# Patient Record
Sex: FEMALE | Race: OTHER
[De-identification: names, ages, dates, MRNs, and addresses within clinical notes are randomized per-mention and may not be internally consistent; named-entity substitution may affect disease eponyms.]

---

## 2019-08-23 NOTE — PRE-OP HX & PHY REPO 2 SIG
DATE OF ADMISSION:  08/26/2019

DATE OF ANTICIPATED SURGERY:  08/26/2019.



PREOPERATIVE DIAGNOSIS:  Retinal detachment, right eye, macula off.



BRIEF NOTE:  This is a first [facility] admission for the patient,

who is a very pleasant 69-year-old lady, who complained of progressive

blurring of vision with shadow in the right eye for the past two weeks.

She was noted to have an inferior retinal detachment with the macula

off.



PAST OCULAR HISTORY:  Remarkable for cataract surgery done five years ago

in both eyes.  She had laser treatment to retinal tear successful in the

left eye some time ago.



PAST MEDICAL HISTORY:  Remarkable for elevated cholesterol and thyroid

issues.



PAST SURGICAL HISTORY:  She had prior knee surgery on the right and a wrist

fracture in 2018.



MEDICATIONS:  She is on anticholesterol medications and thyroid supplement.

She also takes aspirin.



ALLERGIES:  She has no known allergies.



PHYSICAL EXAMINATION:  Best vision at the time of admission was 20/200 in

the right eye and 20/20- in the left.  There was an absence of superior

_____ on the right and a central scotoma.  The left appeared benign.

Posterior chamber lens was seen in either eye.



Fundus exam of the right eye showed an inferior retinal detachment

extending from the 2 o'clock position around to the 9:30 position with the

macula off.  Small perivascular holes were seen at about the 6:30 position

just anterior to the equator.  The left fundus showed old laser scars at

the 4 o'clock position.  The retina was attached.



ASSESSMENT:  Macula off retinal detachment, right eye.



PLAN:  The plan is to perform a pars plana vitrectomy with scleral buckle,

endolaser, and gas injection on the right.  The risks and benefits of

surgery gone over with the patient with potential infection, hemorrhage,

glaucoma, and remote possibility of loss of the eye, the risk of

anesthesia was discussed.  The patient understands and consents to

surgery, which will be performed on Monday morning.









  ______________________________________________

  Sebastian Barnes M.D.





DR:  MILES

D:  08/23/2019 13:57

T:  08/24/2019 03:50

JOB#:  [id number]

CC:

## 2019-08-26 ENCOUNTER — HOSPITAL ENCOUNTER (OUTPATIENT)
Dept: HOSPITAL 72 - SUR | Age: 69
Discharge: HOME | End: 2019-08-26
Payer: MEDICARE

## 2019-08-26 VITALS — DIASTOLIC BLOOD PRESSURE: 80 MMHG | SYSTOLIC BLOOD PRESSURE: 140 MMHG

## 2019-08-26 VITALS — DIASTOLIC BLOOD PRESSURE: 84 MMHG | SYSTOLIC BLOOD PRESSURE: 141 MMHG

## 2019-08-26 VITALS — SYSTOLIC BLOOD PRESSURE: 144 MMHG | DIASTOLIC BLOOD PRESSURE: 78 MMHG

## 2019-08-26 VITALS — DIASTOLIC BLOOD PRESSURE: 59 MMHG | SYSTOLIC BLOOD PRESSURE: 155 MMHG

## 2019-08-26 VITALS — DIASTOLIC BLOOD PRESSURE: 77 MMHG | SYSTOLIC BLOOD PRESSURE: 149 MMHG

## 2019-08-26 VITALS — HEIGHT: 65 IN | BODY MASS INDEX: 34.49 KG/M2 | WEIGHT: 207 LBS

## 2019-08-26 VITALS — SYSTOLIC BLOOD PRESSURE: 152 MMHG | DIASTOLIC BLOOD PRESSURE: 73 MMHG

## 2019-08-26 VITALS — DIASTOLIC BLOOD PRESSURE: 59 MMHG | SYSTOLIC BLOOD PRESSURE: 161 MMHG

## 2019-08-26 VITALS — SYSTOLIC BLOOD PRESSURE: 161 MMHG | DIASTOLIC BLOOD PRESSURE: 72 MMHG

## 2019-08-26 VITALS — DIASTOLIC BLOOD PRESSURE: 95 MMHG | SYSTOLIC BLOOD PRESSURE: 152 MMHG

## 2019-08-26 VITALS — DIASTOLIC BLOOD PRESSURE: 80 MMHG | SYSTOLIC BLOOD PRESSURE: 14 MMHG

## 2019-08-26 VITALS — DIASTOLIC BLOOD PRESSURE: 74 MMHG | SYSTOLIC BLOOD PRESSURE: 153 MMHG

## 2019-08-26 VITALS — SYSTOLIC BLOOD PRESSURE: 151 MMHG | DIASTOLIC BLOOD PRESSURE: 78 MMHG

## 2019-08-26 DIAGNOSIS — E78.00: ICD-10-CM

## 2019-08-26 DIAGNOSIS — M19.90: ICD-10-CM

## 2019-08-26 DIAGNOSIS — E03.9: ICD-10-CM

## 2019-08-26 DIAGNOSIS — E66.9: ICD-10-CM

## 2019-08-26 DIAGNOSIS — Z79.899: ICD-10-CM

## 2019-08-26 DIAGNOSIS — H33.21: Primary | ICD-10-CM

## 2019-08-26 DIAGNOSIS — Z79.82: ICD-10-CM

## 2019-08-26 DIAGNOSIS — E78.5: ICD-10-CM

## 2019-08-26 LAB
ADD MANUAL DIFF: NO
ANION GAP SERPL CALC-SCNC: 8 MMOL/L (ref 5–15)
BASOPHILS NFR BLD AUTO: 1.3 % (ref 0–2)
BUN SERPL-MCNC: 13 MG/DL (ref 7–18)
CALCIUM SERPL-MCNC: 9.3 MG/DL (ref 8.5–10.1)
CHLORIDE SERPL-SCNC: 108 MMOL/L (ref 98–107)
CO2 SERPL-SCNC: 26 MMOL/L (ref 21–32)
CREAT SERPL-MCNC: 0.8 MG/DL (ref 0.55–1.3)
EOSINOPHIL NFR BLD AUTO: 4.4 % (ref 0–3)
ERYTHROCYTE [DISTWIDTH] IN BLOOD BY AUTOMATED COUNT: 12.8 % (ref 11.6–14.8)
HCT VFR BLD CALC: 42.3 % (ref 37–47)
HGB BLD-MCNC: 13.8 G/DL (ref 12–16)
LYMPHOCYTES NFR BLD AUTO: 33.3 % (ref 20–45)
MCV RBC AUTO: 91 FL (ref 80–99)
MONOCYTES NFR BLD AUTO: 9.8 % (ref 1–10)
NEUTROPHILS NFR BLD AUTO: 51.2 % (ref 45–75)
PLATELET # BLD: 228 K/UL (ref 150–450)
POTASSIUM SERPL-SCNC: 3.8 MMOL/L (ref 3.5–5.1)
RBC # BLD AUTO: 4.66 M/UL (ref 4.2–5.4)
SODIUM SERPL-SCNC: 142 MMOL/L (ref 136–145)
WBC # BLD AUTO: 4.3 K/UL (ref 4.8–10.8)

## 2019-08-26 PROCEDURE — 85025 COMPLETE CBC W/AUTO DIFF WBC: CPT

## 2019-08-26 PROCEDURE — 80048 BASIC METABOLIC PNL TOTAL CA: CPT

## 2019-08-26 PROCEDURE — 94003 VENT MGMT INPAT SUBQ DAY: CPT

## 2019-08-26 PROCEDURE — 93005 ELECTROCARDIOGRAM TRACING: CPT

## 2019-08-26 PROCEDURE — 36415 COLL VENOUS BLD VENIPUNCTURE: CPT

## 2019-08-26 PROCEDURE — 94150 VITAL CAPACITY TEST: CPT

## 2019-08-26 PROCEDURE — 67108 REPAIR DETACHED RETINA: CPT

## 2019-08-26 RX ADMIN — MOXIFLOXACIN HYDROCHLORIDE SCH DROP: 5 SOLUTION/ DROPS OPHTHALMIC at 06:54

## 2019-08-26 RX ADMIN — FLURBIPROFEN SODIUM SCH DROP: 0.3 SOLUTION/ DROPS OPHTHALMIC at 07:02

## 2019-08-26 RX ADMIN — PHENYLEPHRINE HYDROCHLORIDE SCH DROP: 25 SOLUTION/ DROPS OPHTHALMIC at 07:13

## 2019-08-26 RX ADMIN — MOXIFLOXACIN HYDROCHLORIDE SCH DROP: 5 SOLUTION/ DROPS OPHTHALMIC at 07:02

## 2019-08-26 RX ADMIN — CYCLOPENTOLATE HYDROCHLORIDE SCH DROP: 10 SOLUTION OPHTHALMIC at 06:54

## 2019-08-26 RX ADMIN — PHENYLEPHRINE HYDROCHLORIDE SCH DROP: 25 SOLUTION/ DROPS OPHTHALMIC at 06:54

## 2019-08-26 RX ADMIN — PHENYLEPHRINE HYDROCHLORIDE SCH DROP: 25 SOLUTION/ DROPS OPHTHALMIC at 07:02

## 2019-08-26 RX ADMIN — FLURBIPROFEN SODIUM SCH DROP: 0.3 SOLUTION/ DROPS OPHTHALMIC at 07:13

## 2019-08-26 RX ADMIN — FLURBIPROFEN SODIUM SCH DROP: 0.3 SOLUTION/ DROPS OPHTHALMIC at 06:54

## 2019-08-26 RX ADMIN — MOXIFLOXACIN HYDROCHLORIDE SCH DROP: 5 SOLUTION/ DROPS OPHTHALMIC at 07:13

## 2019-08-26 RX ADMIN — CYCLOPENTOLATE HYDROCHLORIDE SCH DROP: 10 SOLUTION OPHTHALMIC at 07:02

## 2019-08-26 RX ADMIN — CYCLOPENTOLATE HYDROCHLORIDE SCH DROP: 10 SOLUTION OPHTHALMIC at 07:13

## 2019-08-26 NOTE — IMMEDIATE POST-OP EVALUATION
Immediate Post-Op Evalulation


Immediate Post-Op Evalulation


Procedure:  pars plana vitrectomy, cryo scleral buckle, endolaser gas/fluid 

exchange 23


Date of Evaluation:  Aug 26, 2019


Time of Evaluation:  11:52


IV Fluids:  lr 1000ml, 0.9ns 25ml


Blood Products:  none


Estimated Blood Loss:  negligible


Blood Pressure Systolic:  149


Blood Pressure Diastolic:  77


Pulse Rate:  94


Respiratory Rate:  18


O2 Sat by Pulse Oximetry:  99


Temperature (Fahrenheit):  97.0


Pain Score (1-10):  0


Nausea:  No


Vomiting:  No


Complications


none


Patient Status:  awake, reacts, patent, extubated


Hydration Status:  adequate


Drug:  Aurelia Castillo MD Aug 26, 2019 12:00

## 2019-08-26 NOTE — BRIEF OPERATIVE NOTE
Immediate Post Operative Note


Operative Note


Chief Complaint:  Shadow in vision Right eye


Pre-op Diagnosis:


Retinal detachment Right eye, macula off


Procedure:


PPV, scleral buckle, endolaser, cryopexy, Perfluoron placement and removal, gas 

fluid exchange 24% SF-6 Right eye


Post-op Diagnosis:  same as pre-op


Surgeon:  kvng


Anesthesiologist:  homero


Anesthesia:  general


Specimen:  none


Complications:  none


Condition:  stable


Fluids:  per anesthesia


Estimated Blood Loss:  none


Drains:  none


Implant(s) used?:  Yes - 240 band, 287 tire.70 dahianave











Sebastian Barnes MD Aug 26, 2019 11:38

## 2019-08-26 NOTE — ANETHESIA PREOPERATIVE EVAL
Anesthesia Pre-op PMH/ROS


General


Date of Evaluation:  Aug 26, 2019


Time of Evaluation:  08:48


Anesthesiologist:  lloyd


ASA Score:  ASA 3


Mallampati Score


Class I : Soft palate, uvula, fauces, pillars visible


Class II: Soft palate, uvula, fauces visible


Class III: Soft palate, base of uvula visible


Class IV: Only hard plate visible


Mallampati Classification:  Class II


Surgeon:  kvng


Diagnosis:  Retinal detachment right eye


Surgical Procedure:  pars plana vitrectomy, scleral buckle, cryo, endolaser, gas

/fluid exchange


Anesthesia History:  none


Social History:  smoking - former


Family History:  no anesthesia problems


Allergies:  


Coded Allergies:  


     No Known Allergies (Unverified , 8/26/19)


Medications:  see eMAR


Patient NPO?:  Yes





Past Medical History


Cardiovascular:  Reports: other - hypercholesterolemia


Neurologic/Psychiatric:  Reports: depression/anxiety


Endocrine:  Reports: hypothyroidism


Musculoskeletal/Integumentary:  Reports: OA, DJD - tka left knee, btl


Other:  obesity





Anesthesia Pre-op Phys. Exam


Physician Exam


Constitutional:  NAD


Neurologic:  CN 2-12 intact


Cardiovascular:  RRR


Gastrointestinal:  S/NT/ND





Airway Exam


Mallampati Score:  Class II


MO:  limited


Neck:  short


TMD:  2fb


ROM:  limited


Teeth:  missing


Dentures:  upper, lower





Anesthesia Pre-op A/P


Labs





Hematology








Test


  8/26/19


07:10


 


White Blood Count


  4.3 K/UL


(4.8-10.8)  L


 


Red Blood Count


  4.66 M/UL


(4.20-5.40)


 


Hemoglobin


  13.8 G/DL


(12.0-16.0)


 


Hematocrit


  42.3 %


(37.0-47.0)


 


Mean Corpuscular Volume 91 FL (80-99)  


 


Mean Corpuscular Hemoglobin


  29.7 PG


(27.0-31.0)


 


Mean Corpuscular Hemoglobin


Concent 32.7 G/DL


(32.0-36.0)


 


Red Cell Distribution Width


  12.8 %


(11.6-14.8)


 


Platelet Count


  228 K/UL


(150-450)


 


Mean Platelet Volume


  8.8 FL


(6.5-10.1)


 


Neutrophils (%) (Auto)


  51.2 %


(45.0-75.0)


 


Lymphocytes (%) (Auto)


  33.3 %


(20.0-45.0)


 


Monocytes (%) (Auto)


  9.8 %


(1.0-10.0)


 


Eosinophils (%) (Auto)


  4.4 %


(0.0-3.0)  H


 


Basophils (%) (Auto)


  1.3 %


(0.0-2.0)








Chemistry








Test


  8/26/19


07:10


 


Sodium Level


  142 MMOL/L


(136-145)


 


Potassium Level


  3.8 MMOL/L


(3.5-5.1)


 


Chloride Level


  108 MMOL/L


()  H


 


Carbon Dioxide Level


  26 MMOL/L


(21-32)


 


Anion Gap


  8 mmol/L


(5-15)


 


Blood Urea Nitrogen


  13 mg/dL


(7-18)


 


Creatinine


  0.8 MG/DL


(0.55-1.30)


 


Estimat Glomerular Filtration


Rate > 60 mL/min


(>60)


 


Glucose Level


  100 MG/DL


()


 


Calcium Level


  9.3 MG/DL


(8.5-10.1)











Risk Assessment & Plan


Assessment:


asa3


Plan:


gen


Status Change Before Surgery:  No





Pre-Antibiotics


Drug:  Aurelia Castillo MD Aug 26, 2019 11:58

## 2019-08-26 NOTE — 48 HOUR POST ANESTHESIA EVAL
Post Anesthesia Evaluation


Procedure:  pars plana vitrectomy, cryo scleral buckle, endolaser gas/fluid 

exchange 23


Date of Evaluation:  Aug 26, 2019


Time of Evaluation:  12:00


Blood Pressure Systolic:  151


0:  78


Pulse Rate:  88


Respiratory Rate:  18


Temperature (Fahrenheit):  97.0


O2 Sat by Pulse Oximetry:  97


Airway:  patent


Nausea:  No


Vomiting:  No


Pain Intensity:  0


Hydration Status:  adequate


Cardiopulmonary Status:


stable


Mental Status/LOC:  patient returned to baseline


Post-Anesthesia Complications:


none


Follow-up care needed:  N/A











Aurelia Muniz MD Aug 26, 2019 12:02

## 2019-08-26 NOTE — PRE-PROCEDURE NOTE/ATTESTATION
Pre-Procedure Note/Attestation


Complete Prior to Procedure


Planned Procedure:  right


Procedure Narrative:


Scleral buckle, Pars Plan vitrectomy, endolaser, cryopexy. gas-fluid exchange 

Right eye





Indications for Procedure


Pre-Operative Diagnosis:


Retinal detachment Right eye, macula off





Attestation


I attest that I discussed the nature of the procedure; its benefits; risks and 

complications; and alternatives (and the risks and benefits of such alternatives

), prior to the procedure, with the patient (or the patient's legal 

representative).





I attest that, if there was a reasonable possibility of needing a blood 

transfusion, the patient (or the patient's legal representative) was given the 

[place] Department [employer] standardized written summary, pursuant 

to the Jorge Inger Blood Safety Act ([place] Health and Safety Code # 1645, as 

amended).





I attest that I re-evaluated the patient just prior to the surgery and that 

there has been no change in the patient's H&P, except as documented below:











Sebastian Barnes MD Aug 26, 2019 08:52

## 2019-08-27 NOTE — OPERATIVE NOTE - DICTATED
DATE OF OPERATION:  08/26/2019

PREOPERATIVE DIAGNOSIS:  Macula-off retinal detachment, right eye.

 



POSTOPERATIVE DIAGNOSIS:  Macula-off retinal detachment, right eye.



PROCEDURES:

1. Pars plana vitrectomy.

2. Scleral buckle.

3. Endolaser.

4. Peripheral cryopexy.

5. Perfluoron placement and removal.

6. Gas-fluid exchange, right eye.



SURGEON:  Sebastian Barnes M.D.



ASSISTANT:  None.



ANESTHESIA:  LMA general.



ANESTHESIOLOGIST:  Dr. Bess.



JUSTIFICATION FOR SURGERY:  This 69-year-old lady developed a shadow and

eventual loss of central vision in the right eye over the past two weeks.

She was found to have a macula-off retinal detachment.



BRIEF NOTE:  The patient was brought to the operating room, placed on OR

table in supine position.  After a time-out was performed and agreed upon

by the staff, general LMA anesthesia was induced by Dr. Bess.  The

patient was then prepped and draped in the normal manner.  A lid speculum

inserted into the right eye.  A 360-degree peritomy was cut with

relaxation incisions at the 3 and 9 o'clock positions.  Sub-Tenon's

anesthetic was then injected in all four quadrants.  The muscles were

isolated and turned on white and black ties.  Examination revealed area of

a small peripheral break at the 4 o'clock position and a second area at

roughly the 7 o'clock position with pigment at 6.  These areas were marked

on the sclera and gentle cryopexy was used to treat the suspicious

areas.



Preparation for scleral buckle was then made.  Mattress sutures of 5-0

nylon were placed 6 mm apart in the inferior quadrant, centered 13 mm from

the limbus.  Mattress sutures were placed in superior quadrant, also

centered 13 mm from the limbus roughly 2.5 mm apart.  A segment of 240

band was then chosen and placed around the eye and secured supranasally

with a 70 sleeve.  A segment of 287 tire was then chosen and placed

beneath the inferior mattress sutures and beneath the inferior rectus

muscle.  The sutures were tied and all knots rotated posteriorly.  The

buckle was tightened loosely for moderate buckling affect.  Preparation

was then made for pars plana vitrectomy.  Using a 23-gauge trocar system,

cannulas were placed in all except infranasal quadrant.  Infusion secured

inferotemporally.  Vitrectomy was begun posterior to the lens implant.  A

central core vitrectomy was done followed by peripheral vitrectomy leaving

a small vitreous skirt.  Shaving of the vitreous in the periphery was

performed.  A small break noted at the 7 o'clock position was marked with

the Endodiathermy.  It was elected not to perform a posterior retinotomy,

but instead Perfluoron was slowly injected into the eye and fluid was

drained from the inferotemporal break.  Roughly 90% of the fluid was

removed in this way.  The endolaser was brought into the eye and a power

of 0.3 calderón, duration 0.2 seconds, a total of 581 lesions were applied

reinforcing the light cryo especially at the 7 o'clock break.  Additional

suspicious lesions were also treated with laser.



An air-fluid exchange was then performed using extrusion to remove the

Perfluoron and remaining of vitreous fluid.  The retina was noted to be

flattened nicely and remained dry free of heavy Perfluoron.



At this juncture, a gas-gas exchange was performed using a 24% mixture

of SF6.  The superior cannulas were then removed from the eye and the

wounds closed with 8-0 Vicryl suture.  With the eye inflated to normal

tension, the infusion line was removed and this sclerotomy was closed in a

similar fashion.



The bridle sutures were removed and conjunctiva and Tenon's were pulled

up and secured with 6-0 plain catgut with the knots buried at 3 and 9.

Subconjunctival Decadron and gentamicin were then injected inferiorly and

topical Maxitrol ointment, prednisolone drops, and moxifloxacin drops were

instilled.  The eye was patched and shielded.  The patient taken to

recovery in excellent condition.  There were no complications.  The

patient is to be placed in a face-down or left side down position for 45

minutes or hour over the next 24 hours.









  ______________________________________________

  Sebastian Barnes M.D.





DR:  MILES

D:  08/26/2019 11:46

T:  08/27/2019 00:31

JOB#:  [id number]

CC:  Sebastian Barnes M.D.; Fax#:  [phone]

## 2019-08-27 NOTE — CARDIOLOGY REPORT
--------------- APPROVED REPORT --------------





EKG Measurement

Heart [id number]

AK [id number]

[id number]

[id number]

[id number]





Normal sinus rhythm

Normal ECG

## 2019-08-27 NOTE — PRE-OP HX & PHY REPO 2 SIG
DATE OF ADMISSION:  08/26/2019

PRESURGICAL INTERNAL MEDICINE HISTORY AND PHYSICAL



REASON FOR EVALUATION:  I was asked by Dr. Sebastian Barnes to see this

69-year-old female who is going for elective surgery on the right eye.

The patient has retinal detachment, right eye.  The patient was examined.

Chart was reviewed in the outpatient surgical procedure at [facility].



PAST MEDICAL HISTORY/REVIEW OF SYSTEMS:  Remarkable for hypothyroidism.  No

history of hypertension.  No chest pain, palpitation, or heart attack.

Denies history of respiratory problem.  The patient has history of

hyperlipidemia.  No GI bleeding or heartburn.  No hepatitis.  Denies

history of renal failure.  No respiratory problem.



FAMILY HISTORY:  Father has diabetes mellitus and mother hypertension.



ALLERGIES:  Not known.



PRESENT MEDICATIONS:  Include levothyroxine, aspirin, and statin.



HABITS:  The patient smoked for approximately 25 years heavily, stopped

smoking 20 years ago.  Denies alcohol or street drug use.



PHYSICAL EXAMINATION:

GENERAL:  The patient is alert, well-developed and well-nourished female,

in her 60s, no acute distress.

VITAL SIGNS:  Blood pressure 141/84, temperature 97.3, pulse 68 per minute

and regular, O2 saturation 99% on room air.

SKIN:  Dry, warm, clear.  No rashes.  No ulceration.

LYMPH NODES:  Not enlarged

HEENT:  Head normocephalic and atraumatic.  Ears, clear.  Eyes, full

description per Dr. Sebastian Barnes.  Mouth, clear and moist.  Wears

dentures, upper and low.

NECK:  Supple.  No jugular distention.  Carotids artery +2.  Trachea

midline.

CHEST:  No deformity or asymmetry.

LUNGS:  Clear to auscultation and percussion.  No rales or rhonchi.

 

HEART:  Sinus rhythm.  No ectopy.  No murmur.  No S3 or S4.

ABDOMEN:  Soft, obese.  Liver and spleen not enlarged.  No rebound.

 

EXTREMITIES:  Left knee has scar, post total knee replacement and left knee

scar ______ fracture of the  ______

GENITOURINARY:  Normal for gender.  CVA nontender.

NERVOUS SYSTEM:  No tremor.  No nystagmus.



LABORATORY AND DIAGNOSTIC DATA:  ECG normal sinus rhythm, 61 per minute,

normal ECG.  Laboratory pending.



IMPRESSION:

1. Retinal detachment, right eye.

2. Hypothyroidism.

3. Hyperlipidemia.



PLAN:

1. Pars plana vitrectomy.

2. Scleral buckle.

3. Endolaser right eye per Dr. Sebastian Barnes.



CONCLUSION:  The patient is a 69-year-old female, has history of

hypothyroidism controlled and hyperlipidemia.  Vital signs stable.  ECG is

normal.  Laboratory work pending.  The patient's condition optimized for

surgery.  The patient's last p.o. intake 7 p.m. yesterday.



Thank you very much, Dr. Barnes, for privilege to participate in

presurgical care.









  ______________________________________________

  Fortino Green M.D.





DR:  Teja

D:  08/26/2019 08:02

T:  08/26/2019 14:48

JOB#:  [id number]

CC: